# Patient Record
Sex: FEMALE | Race: WHITE | URBAN - METROPOLITAN AREA
[De-identification: names, ages, dates, MRNs, and addresses within clinical notes are randomized per-mention and may not be internally consistent; named-entity substitution may affect disease eponyms.]

---

## 2017-07-31 ENCOUNTER — CARE COORDINATION (OUTPATIENT)
Dept: SURGERY | Facility: CLINIC | Age: 67
End: 2017-07-31

## 2017-07-31 NOTE — PROGRESS NOTES
Patient had up and down incision for hysterectomy 2003. In 2009 had a hernia repair open by Dr. Núñez in that area, 2011 had developed another hernia up higher which laparoscopically.     About a year ago she has noticed now another one in the same area.    5'1   150   28.3  No smoking  No chronic pain

## 2017-08-01 ENCOUNTER — CARE COORDINATION (OUTPATIENT)
Dept: SURGERY | Facility: CLINIC | Age: 67
End: 2017-08-01

## 2017-08-01 DIAGNOSIS — K46.9 HERNIA, ABDOMINAL: Primary | ICD-10-CM

## 2017-08-01 NOTE — PROGRESS NOTES
Cassandra called returning Yoana's phone call. I gave her the information that needs CT today or tomorrow. Number provided to make appointment and call transferred to schedule.

## 2017-08-03 ENCOUNTER — OFFICE VISIT (OUTPATIENT)
Dept: SURGERY | Facility: CLINIC | Age: 67
End: 2017-08-03

## 2017-08-03 VITALS
WEIGHT: 151.2 LBS | DIASTOLIC BLOOD PRESSURE: 68 MMHG | TEMPERATURE: 98.3 F | HEART RATE: 74 BPM | OXYGEN SATURATION: 99 % | HEIGHT: 61 IN | SYSTOLIC BLOOD PRESSURE: 132 MMHG | BODY MASS INDEX: 28.55 KG/M2

## 2017-08-03 DIAGNOSIS — K40.20 BILATERAL INGUINAL HERNIA WITHOUT OBSTRUCTION OR GANGRENE, RECURRENCE NOT SPECIFIED: Primary | ICD-10-CM

## 2017-08-03 RX ORDER — CALCIUM CARBONATE 500(1250)
1250 TABLET,CHEWABLE ORAL
COMMUNITY
Start: 2014-08-15

## 2017-08-03 RX ORDER — TRIAMCINOLONE ACETONIDE 0.25 MG/G
CREAM TOPICAL
COMMUNITY
Start: 2016-12-07

## 2017-08-03 RX ORDER — AMITRIPTYLINE HYDROCHLORIDE 100 MG/1
100 TABLET ORAL
COMMUNITY
Start: 2017-06-15

## 2017-08-03 RX ORDER — LISINOPRIL 10 MG/1
10 TABLET ORAL
COMMUNITY
Start: 2017-04-21

## 2017-08-03 RX ORDER — PRAVASTATIN SODIUM 40 MG
40 TABLET ORAL
COMMUNITY
Start: 2016-12-07

## 2017-08-03 ASSESSMENT — ENCOUNTER SYMPTOMS
TROUBLE SWALLOWING: 0
DECREASED APPETITE: 0
BLOATING: 0
HYPOTENSION: 0
DIARRHEA: 0
SWOLLEN GLANDS: 0
COUGH DISTURBING SLEEP: 0
PALPITATIONS: 0
MUSCLE WEAKNESS: 0
SPUTUM PRODUCTION: 0
HEMOPTYSIS: 0
POLYDIPSIA: 0
NAIL CHANGES: 0
ALTERED TEMPERATURE REGULATION: 0
BREAST MASS: 0
FLANK PAIN: 0
FEVER: 0
BREAST PAIN: 0
NUMBNESS: 0
HOARSE VOICE: 0
JAUNDICE: 0
EXERCISE INTOLERANCE: 0
CLAUDICATION: 0
PARALYSIS: 0
INCREASED ENERGY: 0
HEMATURIA: 0
SORE THROAT: 0
EYE PAIN: 0
NECK MASS: 0
SPEECH CHANGE: 0
NIGHT SWEATS: 0
SLEEP DISTURBANCES DUE TO BREATHING: 0
RECTAL PAIN: 0
SINUS PAIN: 0
DECREASED LIBIDO: 0
ABDOMINAL PAIN: 1
BACK PAIN: 0
HYPERTENSION: 0
CONSTIPATION: 1
FATIGUE: 0
POOR WOUND HEALING: 0
MUSCLE CRAMPS: 0
DIFFICULTY URINATING: 0
LOSS OF CONSCIOUSNESS: 0
INSOMNIA: 0
DECREASED CONCENTRATION: 0
TREMORS: 0
BRUISES/BLEEDS EASILY: 0
VOMITING: 0
POSTURAL DYSPNEA: 0
WHEEZING: 0
ORTHOPNEA: 0
DISTURBANCES IN COORDINATION: 0
MEMORY LOSS: 0
LIGHT-HEADEDNESS: 0
BOWEL INCONTINENCE: 0
WEIGHT GAIN: 0
DYSURIA: 0
LEG SWELLING: 0
NAUSEA: 0
DIZZINESS: 0
LEG PAIN: 0
EYE WATERING: 0
JOINT SWELLING: 0
WEIGHT LOSS: 0
ARTHRALGIAS: 1
HEADACHES: 0
TINGLING: 0
MYALGIAS: 0
POLYPHAGIA: 0
WEAKNESS: 0
NECK PAIN: 0
SINUS CONGESTION: 0
BLOOD IN STOOL: 0
RECTAL BLEEDING: 0
EYE IRRITATION: 0
STIFFNESS: 0
TASTE DISTURBANCE: 0
NERVOUS/ANXIOUS: 0
RESPIRATORY PAIN: 0
EXTREMITY NUMBNESS: 0
TACHYCARDIA: 0
HEARTBURN: 0
SEIZURES: 0
EYE REDNESS: 0
HALLUCINATIONS: 0
HOT FLASHES: 0
PANIC: 0
SKIN CHANGES: 0
DYSPNEA ON EXERTION: 0
COUGH: 0
DEPRESSION: 0
SNORES LOUDLY: 0
SYNCOPE: 0
DOUBLE VISION: 0
SMELL DISTURBANCE: 0
CHILLS: 0
SHORTNESS OF BREATH: 0

## 2017-08-03 ASSESSMENT — PAIN SCALES - GENERAL: PAINLEVEL: NO PAIN (0)

## 2017-08-03 NOTE — NURSING NOTE
"Chief Complaint   Patient presents with     Consult     Incisional hernia       Vitals:    08/03/17 0903   BP: 132/68   Pulse: 74   Temp: 98.3  F (36.8  C)   TempSrc: Oral   SpO2: 99%   Weight: 151 lb 3.2 oz   Height: 5' 1.34\"       Body mass index is 28.25 kg/(m^2).    Marie Bowens CMA                          "

## 2017-08-03 NOTE — LETTER
8/3/2017       RE: Cassandra Sanchez  [address] [place] [address]     Dear Colleague,    Thank you for referring your patient, Cassandra Sanchez, to the [facility] GENERAL SURGERY at [facility]. Please see a copy of my visit note below.      New Hernia Consultation Note      Cassandra Sanchez  [id number]  1950    Requesting Provider: Ramone Hinkle      I was asked by Ramone Hinkle to see this patient for the following problem:    CHIEF COMPLAINT:  Chief Complaint Reviewed With Patient 8/3/2017   I am here today to be seen for: Hernia (Unsure What Type)       HISTORY OF PRESENT ILLNESS:  Cassandra Sanchez is a 67 year old female with a history of incisional hernia 2/2 vertical  in , s/p open hernia repair () and lap hernia repair (, Jennifer), both with mesh, presenting with concerns for hernia recurrence. Over the past year the patient has experienced a subumbilical bulge, most apparent when laying down and lifting up her head. She states that this bulge is in the same location as her prior incisional hernia which was repaired in . Her main concern is that this hernia is contributing to her constipation. She c/o constipation over the last six months. She was recently evaluated by a gastroenterologist and underwent a colonoscopy which was unremarkable. She was started on miralax three weeks ago, and her bowel function has improved. She is now having regular, formed, BM's almost daily.      Timing of Hernia Reviewed With Patient 8/3/2017   The onset of my hernia symptoms was: Sudden   My symptoms are: Constant   My symptoms have been: Worsening       Duration Reviewed With Patient 8/3/2017   My symptoms began: bulge       Modifying Factor Questions Reviewed With Patient 8/3/2017   My hernia symptoms improve with: doesnt   My hernia symptoms worsen with: the same       Associated Signs and Symptoms Reviewed With Patient 8/3/2017   I  have the following complaints/concerns related to my hernia: Constipation, Abdominal Bulge or Protusion       [facility] SUR[facility]ERY-HERNIA HISTORY 8/3/2017   My hernia has been repaired with mesh in the past? Yes   The mesh is still in place? Yes   In the past I have had this many hernia repairs in this location: 2       Patient Supplied Answers To HerQLes Assessment Questionnaire  No flowsheet data found.  _______________________________________________________________________    MEDICATIONS:  Current Outpatient Prescriptions   Medication     amitriptyline (ELAVIL) 100 MG tablet     B Complex Vitamins (VITAMIN B COMPLEX PO)     calcium carbonate 1250 (500 CA) MG CHEW     lisinopril (PRINIVIL/ZESTRIL) 10 MG tablet     metFORMIN (GLUCOPHAGE) 500 MG tablet     pravastatin (PRAVACHOL) 40 MG tablet     triamcinolone (KENALOG) 0.025 % cream     No current facility-administered medications for this visit.        ALLERGIES:  Allergies   Allergen Reactions     Aspirin      Other reaction(s): Other - Describe In Comment Field  Ringing in ears       Social History     Social History     Marital status:      Spouse name: N/A     Number of children: N/A     Years of education: N/A     Social History Main Topics     Smoking status: Never Smoker     Smokeless tobacco: Never Used     Alcohol use None     Drug use: None     Sexual activity: Not Asked     Other Topics Concern     None     Social History Narrative     None       History reviewed. No pertinent family history.    Review of Systems     Constitutional:  Negative for fever, chills, weight loss, weight gain, fatigue, decreased appetite, night sweats, recent stressors, height gain, height loss, post-operative complications, incisional pain, hallucinations, increased energy, hyperactivity and confused.   HENT:  Negative for ear pain, hearing loss, tinnitus, nosebleeds, trouble swallowing, hoarse voice, mouth sores, sore throat, ear discharge, tooth pain, gum tenderness, taste  disturbance, smell disturbance, hearing aid, bleeding gums, dry mouth, sinus pain, sinus congestion and neck mass.    Eyes:  Negative for double vision, pain, redness, eye pain, decreased vision, eye watering, eye bulging, eye dryness, flashing lights, spots, floaters, strabismus, tunnel vision, jaundice and eye irritation.   Respiratory:   Negative for cough, hemoptysis, sputum production, shortness of breath, wheezing, sleep disturbances due to breathing, snores loudly, respiratory pain, dyspnea on exertion, cough disturbing sleep and postural dyspnea.    Cardiovascular:  Negative for chest pain, dyspnea on exertion, palpitations, orthopnea, claudication, leg swelling, fingers/toes turn blue, hypertension, hypotension, syncope, history of heart murmur, chest pain on exertion, chest pain at rest, pacemaker, few scattered varicosities, leg pain, sleep disturbances due to breathing, tachycardia, light-headedness, exercise intolerance and edema.   Gastrointestinal:  Positive for abdominal pain and constipation. Negative for heartburn, nausea, vomiting, diarrhea, blood in stool, melena, rectal pain, bloating, hemorrhoids, bowel incontinence, jaundice, rectal bleeding, coffee ground emesis and change in stool.   Genitourinary:  Negative for bladder incontinence, dysuria, urgency, hematuria, flank pain, vaginal discharge, difficulty urinating, genital sores, dyspareunia, decreased libido, nocturia, voiding less frequently, arousal difficulty, abnormal vaginal bleeding, excessive menstruation, menstrual changes, hot flashes, vaginal dryness and postmenopausal bleeding.   Musculoskeletal:  Positive for arthralgias. Negative for myalgias, back pain, joint swelling, stiffness, muscle cramps, neck pain, bone pain, muscle weakness and fracture.   Skin:  Negative for nail changes, itching, poor wound healing, rash, hair changes, skin changes, acne, warts, poor wound healing, scarring, flaky skin, Raynaud's phenomenon,  "sensitivity to sunlight and skin thickening.   Neurological:  Negative for dizziness, tingling, tremors, speech change, seizures, loss of consciousness, weakness, light-headedness, numbness, headaches, disturbances in coordination, extremity numbness, memory loss, difficulty walking and paralysis.   Endo/Heme:  Negative for anemia, swollen glands and bruises/bleeds easily.   Psychiatric/Behavioral:  Negative for depression, hallucinations, memory loss, decreased concentration, mood swings and panic attacks.    Breast:  Negative for breast discharge, breast mass, breast pain and nipple retraction.   Endocrine:  Negative for altered temperature regulation, polyphagia, polydipsia, unwanted hair growth and change in facial hair.      No orders of the defined types were placed in this encounter.      PHYSICAL EXAMINATION:  Vital Signs: /68  Pulse 74  Temp 98.3  F (36.8  C) (Oral)  Ht 5' 1.34\"  Wt 151 lb 3.2 oz  SpO2 99%  BMI 28.25 kg/m2  HEENT: NCAT; MMM;   Lungs: Breathing unlabored  Abdomen: soft, non-tender, palpable umbilical hernia plate, however there is no hernia. Mild diastasis recti evident when supine.      IMAGING:  CT scan reviewed:   Lung bases: No pleural effusions. No pericardial effusion. Cardiac  size within normal limits. Trace atelectasis in the right middle lobe.  Mild bilateral lower lobes dependent atelectasis.     Abdomen and pelvis: Decreased diffuse attenuation throughout the liver  parenchyma, hepatic steatosis. Focal fat versus perfusional defect  along the falciform ligament. 4 mm subcentimeter hypoattenuating focus  in the segment 8 of the liver, too small to characterize, likely  representing a small cyst. No intra or extrahepatic biliary  dilatation. Hepatic venous system and portal venous system are patent.  Partially distended gallbladder, unremarkable.     Mildly atrophic pancreatic parenchyma. No focal lesions.     The spleen is not enlarged. No focal lesions.     Adrenal " glands are unremarkable.     Kidneys are normal in size and position. No renal stones or  hydronephrosis. The urinary bladder is well distended and unremarkable  in appearance. Uterus and ovaries are not identified in the present  study. No adnexal masses. No free fluid. No free air.     Decompressed stomach. No dilated loops of bowel. No bowel wall  thickening. Mild colonic diverticulosis. No associated CT findings of  acute diverticulitis.     No abdominal aortic aneurysm. Subcentimeter retroperitoneal,  mesenteric and bilateral inguinal lymph nodes, not enlarged by size  criteria.     Soft tissues and bones: Postoperative changes of hernia repair along  the ventral abdominal wall. No evidence for recurrent herniation in  the present study. Enthesopathic changes off the pelvis and hips. No  aggressive sclerotic or lytic lesions in the bones. Degenerative  changes of bilateral SI joints and hips. Degenerative changes of the  spine. Disc space narrowing at L5-S1. Suggestion of benign-appearing  hemangioma at the vertebral bodies T9. No aggressive sclerotic or  lytic lesions in the bones.         IMPRESSION:  1. Postoperative changes of ventral abdominal wall hernia repair with  no evidence for recurrent herniation in the present study.  2. Hepatic steatosis.    ASSESSMENT AND PLAN: 67 year old F with two prior ventral hernia repair presenting w/ concerns for ventral hernia recurrence contributing to constipation. No hernia evident on physical exam or imaging.    -Reassured patient that she has no hernia requiring surgical intervention  -Encouraged high fiber diet, hydration, and miralax as needed for constipation  -Patient may follow up as needed if she has any future concerns      Sincerely,    Louis Mack MD  Surgery  [phone] (hospital )  [phone] (clinic nurses)

## 2017-08-03 NOTE — MR AVS SNAPSHOT
After Visit Summary   8/3/2017    Cassandra Sanchez    MRN: [id number]           Patient Information     Date Of Birth          1950        Visit Information        Provider Department      8/3/2017 11:00 AM Louis Mack MD [facility] Surgery        Today's Diagnoses     Bilateral inguinal hernia without obstruction or gangrene, recurrence not specified    -  1       Follow-ups after your visit        Follow-up notes from your care team     Return if symptoms worsen or fail to improve.      Your next 10 appointments already scheduled     Aug 03, 2017 11:00 AM CDT   (Arrive by 10:45 AM)   NEW HERNIA SURGERY with Louis Mack MD   [facility] ([facility])    [address]  [address] [place] [facility] [address] [phone]           Do not wear perfume.              Who to contact     Please call your clinic at [phone] to:    Ask questions about your health    Make or cancel appointments    Discuss your medicines    Learn about your test results    Speak to your doctor   If you have compliments or concerns about an experience at your clinic, or if you wish to file a complaint, please contact [facility] Patient Relations at [phone] or email us at [email or web address]         Additional Information About Your Visit        MyChart Information     [employer] gives you secure access to your electronic health record. If you see a primary care provider, you can also send messages to your care team and make appointments. If you have questions, please call your primary care clinic.  If you do not have a primary care provider, please call [phone] and they will assist you.      [employer] is an electronic gateway that provides easy, online access to your medical records. With [employer], you can request a clinic appointment, read your test results, renew a prescription or communicate with your care  "team.     To access your existing account, please contact your [facility] or call [phone] for assistance.        Care EveryWhere ID     This is your Care EveryWhere ID. This could be used by other organizations to access your [facility] medical records  [id number]        Your Vitals Were     Pulse Temperature Height Pulse Oximetry BMI (Body Mass Index)       74 98.3  F (36.8  C) (Oral) 5' 1.34\" 99% 28.25 kg/m2        Blood Pressure from Last 3 Encounters:   08/03/17 132/68    Weight from Last 3 Encounters:   08/03/17 151 lb 3.2 oz              Today, you had the following     No orders found for display       Primary Care Provider Office Phone # Fax #    Claudio Grider [phone] [address] [address]        Equal Access to Services     AMANUEL HOLLIS : Hadii aad nereida hadasho Soomaali, waaxda luqadaha, qaybta kaalmada adeegyada, mukesh king hayvicn rony cottrell . So [facility] [phone].    ATENCIÓN: Si habla español, tiene a cagle disposición servicios gratuitos de asistencia lingüística. Llame al [phone].    We comply with applicable federal civil rights laws and [place] laws. We do not discriminate on the basis of race, color, national origin, age, disability sex, sexual orientation or gender identity.            Thank you!     Thank you for choosing [facility]  for your care. Our goal is always to provide you with excellent care. Hearing back from our patients is one way we can continue to improve our services. Please take a few minutes to complete the written survey that you may receive in the mail after your visit with us. Thank you!             Your Updated Medication List - Protect others around you: Learn how to safely use, store and throw away your medicines at www.disposemymeds.org.          This list is accurate as of: 8/3/17 10:56 AM.  Always use your most recent med list.                   Brand Name " Dispense Instructions for use Diagnosis    amitriptyline 100 MG tablet    ELAVIL     Take 100 mg by mouth        calcium carbonate 1250 (500 CA) MG Chew      Take 1,250 mg by mouth        lisinopril 10 MG tablet    PRINIVIL/ZESTRIL     Take 10 mg by mouth        metFORMIN 500 MG tablet    GLUCOPHAGE     Take 500 mg by mouth        pravastatin 40 MG tablet    PRAVACHOL     Take 40 mg by mouth        triamcinolone 0.025 % cream    KENALOG          VITAMIN B COMPLEX PO      Take 1 capsule by mouth

## 2017-08-03 NOTE — PROGRESS NOTES
New Hernia Consultation Note      Cassandra Sanchez  [id number]  1950    Requesting Provider: Ramone Hinkle      I was asked by Ramone Hinkle to see this patient for the following problem:    CHIEF COMPLAINT:  Chief Complaint Reviewed With Patient 8/3/2017   I am here today to be seen for: Hernia (Unsure What Type)       HISTORY OF PRESENT ILLNESS:  Cassandra Sanchez is a 67 year old female with a history of incisional hernia 2/2 vertical  in , s/p open hernia repair () and [place] [facility] [place], presenting with concerns for hernia recurrence. Over the past year the patient has experienced a subumbilical bulge, most apparent when laying down and lifting up her head. She states that this bulge is in the same location as her prior incisional hernia which was repaired in . Her main concern is that this hernia is contributing to her constipation. She c/o constipation over the last six months. She was recently evaluated by a gastroenterologist and underwent a colonoscopy which was unremarkable. She was started on miralax three weeks ago, and her bowel function has improved. She is now having regular, formed, BM's almost daily.      Timing of Hernia Reviewed With Patient 8/3/2017   The onset of my hernia symptoms was: Sudden   My symptoms are: Constant   My symptoms have been: Worsening       Duration Reviewed With Patient 8/3/2017   My symptoms began: bulge       Modifying Factor Questions Reviewed With Patient 8/3/2017   My hernia symptoms improve with: doesnt   My hernia symptoms worsen with: the same       Associated Signs and Symptoms Reviewed With Patient 8/3/2017   I have the following complaints/concerns related to my hernia: Constipation, Abdominal Bulge or Protusion       [facility] HISTORY 8/3/2017   My hernia has been repaired with mesh in the past? Yes   The mesh is still in place? Yes   In the past I have had this many hernia repairs in this  location: 2       Patient Supplied Answers To HerQLes Assessment Questionnaire  No flowsheet data found.  _______________________________________________________________________    MEDICATIONS:  Current Outpatient Prescriptions   Medication     amitriptyline (ELAVIL) 100 MG tablet     B Complex Vitamins (VITAMIN B COMPLEX PO)     calcium carbonate 1250 (500 CA) MG CHEW     lisinopril (PRINIVIL/ZESTRIL) 10 MG tablet     metFORMIN (GLUCOPHAGE) 500 MG tablet     pravastatin (PRAVACHOL) 40 MG tablet     triamcinolone (KENALOG) 0.025 % cream     No current facility-administered medications for this visit.        ALLERGIES:  Allergies   Allergen Reactions     Aspirin      Other reaction(s): Other - Describe In Comment Field  Ringing in ears       Social History     Social History     Marital status:      Spouse name: N/A     Number of children: N/A     Years of education: N/A     Social History Main Topics     Smoking status: Never Smoker     Smokeless tobacco: Never Used     Alcohol use None     Drug use: None     Sexual activity: Not Asked     Other Topics Concern     None     Social History Narrative     None       History reviewed. No pertinent family history.    Review of Systems     Constitutional:  Negative for fever, chills, weight loss, weight gain, fatigue, decreased appetite, night sweats, recent stressors, height gain, height loss, post-operative complications, incisional pain, hallucinations, increased energy, hyperactivity and confused.   HENT:  Negative for ear pain, hearing loss, tinnitus, nosebleeds, trouble swallowing, hoarse voice, mouth sores, sore throat, ear discharge, tooth pain, gum tenderness, taste disturbance, smell disturbance, hearing aid, bleeding gums, dry mouth, sinus pain, sinus congestion and neck mass.    Eyes:  Negative for double vision, pain, redness, eye pain, decreased vision, eye watering, eye bulging, eye dryness, flashing lights, spots, floaters, strabismus, tunnel vision,  jaundice and eye irritation.   Respiratory:   Negative for cough, hemoptysis, sputum production, shortness of breath, wheezing, sleep disturbances due to breathing, snores loudly, respiratory pain, dyspnea on exertion, cough disturbing sleep and postural dyspnea.    Cardiovascular:  Negative for chest pain, dyspnea on exertion, palpitations, orthopnea, claudication, leg swelling, fingers/toes turn blue, hypertension, hypotension, syncope, history of heart murmur, chest pain on exertion, chest pain at rest, pacemaker, few scattered varicosities, leg pain, sleep disturbances due to breathing, tachycardia, light-headedness, exercise intolerance and edema.   Gastrointestinal:  Positive for abdominal pain and constipation. Negative for heartburn, nausea, vomiting, diarrhea, blood in stool, melena, rectal pain, bloating, hemorrhoids, bowel incontinence, jaundice, rectal bleeding, coffee ground emesis and change in stool.   Genitourinary:  Negative for bladder incontinence, dysuria, urgency, hematuria, flank pain, vaginal discharge, difficulty urinating, genital sores, dyspareunia, decreased libido, nocturia, voiding less frequently, arousal difficulty, abnormal vaginal bleeding, excessive menstruation, menstrual changes, hot flashes, vaginal dryness and postmenopausal bleeding.   Musculoskeletal:  Positive for arthralgias. Negative for myalgias, back pain, joint swelling, stiffness, muscle cramps, neck pain, bone pain, muscle weakness and fracture.   Skin:  Negative for nail changes, itching, poor wound healing, rash, hair changes, skin changes, acne, warts, poor wound healing, scarring, flaky skin, Raynaud's phenomenon, sensitivity to sunlight and skin thickening.   Neurological:  Negative for dizziness, tingling, tremors, speech change, seizures, loss of consciousness, weakness, light-headedness, numbness, headaches, disturbances in coordination, extremity numbness, memory loss, difficulty walking and paralysis.  "  Endo/Heme:  Negative for anemia, swollen glands and bruises/bleeds easily.   Psychiatric/Behavioral:  Negative for depression, hallucinations, memory loss, decreased concentration, mood swings and panic attacks.    Breast:  Negative for breast discharge, breast mass, breast pain and nipple retraction.   Endocrine:  Negative for altered temperature regulation, polyphagia, polydipsia, unwanted hair growth and change in facial hair.      No orders of the defined types were placed in this encounter.      PHYSICAL EXAMINATION:  Vital Signs: /68  Pulse 74  Temp 98.3  F (36.8  C) (Oral)  Ht 5' 1.34\"  Wt 151 lb 3.2 oz  SpO2 99%  BMI 28.25 kg/m2  HEENT: NCAT; MMM;   Lungs: Breathing unlabored  Abdomen: soft, non-tender, palpable umbilical hernia plate, however there is no hernia. Mild diastasis recti evident when supine.      IMAGING:  CT scan reviewed:   Lung bases: No pleural effusions. No pericardial effusion. Cardiac  size within normal limits. Trace atelectasis in the right middle lobe.  Mild bilateral lower lobes dependent atelectasis.     Abdomen and pelvis: Decreased diffuse attenuation throughout the liver  parenchyma, hepatic steatosis. Focal fat versus perfusional defect  along the falciform ligament. 4 mm subcentimeter hypoattenuating focus  in the segment 8 of the liver, too small to characterize, likely  representing a small cyst. No intra or extrahepatic biliary  dilatation. Hepatic venous system and portal venous system are patent.  Partially distended gallbladder, unremarkable.     Mildly atrophic pancreatic parenchyma. No focal lesions.     The spleen is not enlarged. No focal lesions.     Adrenal glands are unremarkable.     Kidneys are normal in size and position. No renal stones or  hydronephrosis. The urinary bladder is well distended and unremarkable  in appearance. Uterus and ovaries are not identified in the present  study. No adnexal masses. No free fluid. No free air.     Decompressed " stomach. No dilated loops of bowel. No bowel wall  thickening. Mild colonic diverticulosis. No associated CT findings of  acute diverticulitis.     No abdominal aortic aneurysm. Subcentimeter retroperitoneal,  mesenteric and bilateral inguinal lymph nodes, not enlarged by size  criteria.     Soft tissues and bones: Postoperative changes of hernia repair along  the ventral abdominal wall. No evidence for recurrent herniation in  the present study. Enthesopathic changes off the pelvis and hips. No  aggressive sclerotic or lytic lesions in the bones. Degenerative  changes of bilateral SI joints and hips. Degenerative changes of the  spine. Disc space narrowing at L5-S1. Suggestion of benign-appearing  hemangioma at the vertebral bodies T9. No aggressive sclerotic or  lytic lesions in the bones.         IMPRESSION:  1. Postoperative changes of ventral abdominal wall hernia repair with  no evidence for recurrent herniation in the present study.  2. Hepatic steatosis.    ASSESSMENT AND PLAN: 67 year old F with two prior ventral hernia repair presenting w/ concerns for ventral hernia recurrence contributing to constipation. No hernia evident on physical exam or imaging.    -Reassured patient that she has no hernia requiring surgical intervention  -Encouraged high fiber diet, hydration, and miralax as needed for constipation  -Patient may follow up as needed if she has any future concerns      Sincerely,    Louis Mack MD  Surgery  [phone] (hospital )  [phone] (clinic nurses)

## 2020-03-02 ENCOUNTER — HEALTH MAINTENANCE LETTER (OUTPATIENT)
Age: 70
End: 2020-03-02

## 2020-12-14 ENCOUNTER — HEALTH MAINTENANCE LETTER (OUTPATIENT)
Age: 70
End: 2020-12-14

## 2021-04-18 ENCOUNTER — HEALTH MAINTENANCE LETTER (OUTPATIENT)
Age: 71
End: 2021-04-18

## 2021-05-28 ENCOUNTER — RECORDS - HEALTHEAST (OUTPATIENT)
Dept: ADMINISTRATIVE | Facility: CLINIC | Age: 71
End: 2021-05-28

## 2021-05-29 ENCOUNTER — RECORDS - HEALTHEAST (OUTPATIENT)
Dept: ADMINISTRATIVE | Facility: CLINIC | Age: 71
End: 2021-05-29

## 2021-07-21 ENCOUNTER — RECORDS - HEALTHEAST (OUTPATIENT)
Dept: ADMINISTRATIVE | Facility: CLINIC | Age: 71
End: 2021-07-21

## 2021-10-02 ENCOUNTER — HEALTH MAINTENANCE LETTER (OUTPATIENT)
Age: 71
End: 2021-10-02

## 2022-03-19 ENCOUNTER — HEALTH MAINTENANCE LETTER (OUTPATIENT)
Age: 72
End: 2022-03-19

## 2022-05-14 ENCOUNTER — HEALTH MAINTENANCE LETTER (OUTPATIENT)
Age: 72
End: 2022-05-14

## 2022-09-03 ENCOUNTER — HEALTH MAINTENANCE LETTER (OUTPATIENT)
Age: 72
End: 2022-09-03

## 2023-06-03 ENCOUNTER — HEALTH MAINTENANCE LETTER (OUTPATIENT)
Age: 73
End: 2023-06-03